# Patient Record
Sex: FEMALE | Race: OTHER | URBAN - METROPOLITAN AREA
[De-identification: names, ages, dates, MRNs, and addresses within clinical notes are randomized per-mention and may not be internally consistent; named-entity substitution may affect disease eponyms.]

---

## 2022-03-23 ENCOUNTER — EMERGENCY (EMERGENCY)
Facility: HOSPITAL | Age: 27
LOS: 0 days | Discharge: HOME | End: 2022-03-23
Attending: EMERGENCY MEDICINE
Payer: MEDICAID

## 2022-03-23 VITALS
WEIGHT: 149.91 LBS | OXYGEN SATURATION: 100 % | SYSTOLIC BLOOD PRESSURE: 11 MMHG | DIASTOLIC BLOOD PRESSURE: 76 MMHG | TEMPERATURE: 98 F | HEART RATE: 111 BPM | HEIGHT: 65 IN | RESPIRATION RATE: 16 BRPM

## 2022-03-23 VITALS
TEMPERATURE: 98 F | OXYGEN SATURATION: 100 % | DIASTOLIC BLOOD PRESSURE: 77 MMHG | SYSTOLIC BLOOD PRESSURE: 111 MMHG | HEART RATE: 96 BPM | RESPIRATION RATE: 16 BRPM
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
## 2022-03-23 DIAGNOSIS — Z91.018 ALLERGY TO OTHER FOODS: ICD-10-CM

## 2022-03-23 DIAGNOSIS — S09.90XA UNSPECIFIED INJURY OF HEAD, INITIAL ENCOUNTER: ICD-10-CM

## 2022-03-23 DIAGNOSIS — R51.9 HEADACHE, UNSPECIFIED: ICD-10-CM

## 2022-03-23 PROCEDURE — 99284 EMERGENCY DEPT VISIT MOD MDM: CPT

## 2022-03-23 PROCEDURE — 70450 CT HEAD/BRAIN W/O DYE: CPT | Mod: 26,MA

## 2022-03-23 RX ORDER — TRAMADOL HYDROCHLORIDE 50 MG/1
50 TABLET ORAL ONCE
Refills: 0 | Status: DISCONTINUED | OUTPATIENT
Start: 2022-03-23 | End: 2022-03-23

## 2022-03-23 RX ORDER — ACETAMINOPHEN 500 MG
975 TABLET ORAL ONCE
Refills: 0 | Status: COMPLETED | OUTPATIENT
Start: 2022-03-23 | End: 2022-03-23

## 2022-03-23 RX ADMIN — Medication 975 MILLIGRAM(S): at 17:42

## 2022-03-23 RX ADMIN — TRAMADOL HYDROCHLORIDE 50 MILLIGRAM(S): 50 TABLET ORAL at 17:43

## 2022-03-23 NOTE — ED ADULT NURSE NOTE - CHIEF COMPLAINT QUOTE
Patient reports "I banged my head on a metal safe on the 3/4/22. Since then I have had headaches, Nausea, ears ringing. and I still have a bump on my head. I have been taking Tylenol for the headaches." No anticoagulant use. SAINI x 4 without deficit or difficultly. PERRLA 3mm. Alert and oriented x 4.

## 2022-03-23 NOTE — ED PROVIDER NOTE - CLINICAL SUMMARY MEDICAL DECISION MAKING FREE TEXT BOX
closed head injury. concussion. closed head injury. concussion. ct head reassuring. [facility]

## 2022-03-23 NOTE — ED PROVIDER NOTE - OBJECTIVE STATEMENT
26F hx of left axillary lymph node pw ha. 2 weeks ago hit head on metal container. since then persistent has and pressure. also notes tinnitus. ros neg for ha, vision loss, rhinorrhea, cp, sob, abd pain, vomiting, fever, chills, numbness, dysuria, pregnancy, depression.

## 2022-03-23 NOTE — ED ADULT NURSE NOTE - CHPI ED NUR SYMPTOMS NEG
no blurred vision/no change in level of consciousness/no confusion/no fever/no loss of consciousness/no vomiting

## 2022-03-23 NOTE — ED ADULT NURSE NOTE - OBJECTIVE STATEMENT
A&OX4. Patient states she hit her head 2weeks ago on metal safe. patient states last week she started experiencing Dizziness, B/L tinnitus, Nausea, headache . denies any syncope episode., or LOC. denies any memory loss within the last two weeks

## 2022-03-23 NOTE — ED ADULT TRIAGE NOTE - CHIEF COMPLAINT QUOTE
Patient reports "I banged my head on a metal safe on the 3/4/22. Since then I have had headaches, Nausea, ears ringing. and I still have a bump on my head. I have been taking Tylenol for the headaches." SAINI x 4 without deficit or difficultly. PERRLA 3mm Patient reports "I banged my head on a metal safe on the 3/4/22. Since then I have had headaches, Nausea, ears ringing. and I still have a bump on my head. I have been taking Tylenol for the headaches." No anticoagulant use. SAINI x 4 without deficit or difficultly. PERRLA 3mm. Alert and oriented x 4.

## 2022-03-23 NOTE — ED PROVIDER NOTE - NSFOLLOWUPINSTRUCTIONS_ED_ALL_ED_FT
Follow up with your regular doctor.     Take tylenol as needed for pain.     Maintain good rest and hydration.

## 2022-03-23 NOTE — ED PROVIDER NOTE - PATIENT PORTAL LINK FT
You can access the FollowMyHealth Patient Portal offered by [facility] by registering at the following website: [email or web address]. By joining [employer]’s FollowMyHealth portal, you will also be able to view your health information using other applications (apps) compatible with our system.
